# Patient Record
Sex: FEMALE | Race: AMERICAN INDIAN OR ALASKA NATIVE | NOT HISPANIC OR LATINO | URBAN - METROPOLITAN AREA
[De-identification: names, ages, dates, MRNs, and addresses within clinical notes are randomized per-mention and may not be internally consistent; named-entity substitution may affect disease eponyms.]

---

## 2023-06-23 LAB
ALANINE AMINOTRANSFERASE (SGPT) (U/L) IN SER/PLAS: 9 U/L (ref 7–45)
ALBUMIN (G/DL) IN SER/PLAS: 4.3 G/DL (ref 3.4–5)
ALBUMIN (G/DL) IN SER/PLAS: 4.3 G/DL (ref 3.4–5)
ALBUMIN (MG/L) IN URINE: 30.8 MG/L
ALBUMIN/CREATININE (UG/MG) IN URINE: 13.9 UG/MG CRT (ref 0–30)
ALKALINE PHOSPHATASE (U/L) IN SER/PLAS: 69 U/L (ref 33–110)
ANION GAP IN SER/PLAS: 14 MMOL/L (ref 10–20)
ASPARTATE AMINOTRANSFERASE (SGOT) (U/L) IN SER/PLAS: 12 U/L (ref 9–39)
BILIRUBIN DIRECT (MG/DL) IN SER/PLAS: 0.1 MG/DL (ref 0–0.3)
BILIRUBIN TOTAL (MG/DL) IN SER/PLAS: 0.3 MG/DL (ref 0–1.2)
CALCIDIOL (25 OH VITAMIN D3) (NG/ML) IN SER/PLAS: 15 NG/ML
CALCIUM (MG/DL) IN SER/PLAS: 9.6 MG/DL (ref 8.6–10.6)
CARBON DIOXIDE, TOTAL (MMOL/L) IN SER/PLAS: 26 MMOL/L (ref 21–32)
CHLORIDE (MMOL/L) IN SER/PLAS: 101 MMOL/L (ref 98–107)
CREATININE (MG/DL) IN SER/PLAS: 0.78 MG/DL (ref 0.5–1.05)
CREATININE (MG/DL) IN URINE: 222 MG/DL (ref 20–320)
ESTIMATED AVERAGE GLUCOSE FOR HBA1C: 160 MG/DL
GFR FEMALE: >90 ML/MIN/1.73M2
GLUCOSE (MG/DL) IN SER/PLAS: 168 MG/DL (ref 74–99)
HEMOGLOBIN A1C/HEMOGLOBIN TOTAL IN BLOOD: 7.2 %
PHOSPHATE (MG/DL) IN SER/PLAS: 3.5 MG/DL (ref 2.5–4.9)
POTASSIUM (MMOL/L) IN SER/PLAS: 4.5 MMOL/L (ref 3.5–5.3)
PROTEIN TOTAL: 7.4 G/DL (ref 6.4–8.2)
SODIUM (MMOL/L) IN SER/PLAS: 136 MMOL/L (ref 136–145)
THYROTROPIN (MIU/L) IN SER/PLAS BY DETECTION LIMIT <= 0.05 MIU/L: 1.51 MIU/L (ref 0.44–3.98)
TISSUE TRANSGLUTAMINASE, IGA: 2 U/ML (ref 0–14)
UREA NITROGEN (MG/DL) IN SER/PLAS: 14 MG/DL (ref 6–23)

## 2024-01-04 ENCOUNTER — APPOINTMENT (OUTPATIENT)
Dept: PRIMARY CARE | Facility: CLINIC | Age: 24
End: 2024-01-04
Payer: COMMERCIAL

## 2024-01-04 NOTE — PROGRESS NOTES
Subjective   Patient ID: Matilde Parnell is a 23 y.o. female who presents for No chief complaint on file..    Last Annual Physical:  Last Dental Visit:   Last Eye exam:  Hearing Concerns:  Diet:   Exercise Routine:       HPI     Review of Systems  Constitutional: No fever or chills, No Night Sweats  Eyes: No Blurry Vision or Eye sight problems  ENT: No Nasal Discharge, Hoarseness, sore throat  Cardiovascular: no chest pain, no palpitations and no syncope.   Respiratory: no cough, no shortness of breath during exertion and no shortness of breath at rest.   Gastrointestinal: no abdominal pain, no nausea and no vomiting.   : No vaginal discharge, burning with urination, no blood in urine or stools  Skin: No Skin rashes or Lesions  Neuro: No Headache, no dizziness or Numbness or tingling  Psych: No Anxiety, depression or sleeping problems  Heme: No Easy bleeding or brusing.     Objective   There were no vitals taken for this visit.    Physical Exam  Patient declined Chaperone  Constitutional: Alert and in no acute distress. Well developed, well nourished.   Head and Face: Head and face: Normal.    Eyes: Normal external exam. Pupils were equal in size, round, reactive to light (PERRL) with normal accommodation and extraocular movements intact (EOMI).   Ears, Nose, Mouth, and Throat: External inspection of ears and nose: Normal.  Hearing: Normal.  Nasal mucosa, septum, and turbinates: Normal.  Lips, teeth, and gums: Normal.  Oropharynx: Normal.   Neck: No neck mass was observed. Supple. Thyroid not enlarged and there were no palpable thyroid nodules.   Cardiovascular: Heart rate and rhythm were normal, normal S1 and S2. Pedal pulses: Normal. No peripheral edema.   Pulmonary: No respiratory distress. Clear bilateral breath sounds.   Breast: Normal Appearance, No Masses or lumps palpated  Abdomen: Soft nontender; no abdominal mass palpated. Normal bowel sounds. No organomegaly.   : Deferred   Musculoskeletal: No joint  swelling seen, normal movements of all extremities. Range of motion: Normal.  Muscle strength/tone: Normal.    Skin: Normal skin color and pigmentation, normal skin turgor, and no rash.   Neurologic: Deep tendon reflexes were 2+ and symmetric.   Psychiatric: Judgment and insight: Intact. Mood and affect: Normal.  Lymphatic: No cervical lymphadenopathy. Palpation of lymph nodes in axillae: Normal.  Palpation of lymph nodes in groin: Normal.    Lab Results   Component Value Date    WBC 10.7 07/29/2021    HGB 14.0 07/29/2021    HCT 44.6 07/29/2021     07/29/2021    CHOL 186 03/17/2022    TRIG 62 03/17/2022    HDL 89.8 03/17/2022    ALT 9 06/23/2023    AST 12 06/23/2023     06/23/2023    K 4.5 06/23/2023     06/23/2023    CREATININE 0.78 06/23/2023    BUN 14 06/23/2023    CO2 26 06/23/2023    TSH 1.51 06/23/2023    HGBA1C 7.2 (A) 06/23/2023           Assessment/Plan   There are no diagnoses linked to this encounter.      Dear Matilde Parnell     It was my pleasure to take care of you today in the office. Below are the things we discussed today:    1. 1. Immunizations: Yearly Flu shot is recommended.          a: COVID: Up-to-Date         b: Tetanus: Up-to-date    2. Blood Work:  3. Seen your dentist twice a year  4. Yearly Eye exam is recommended    5. BMI:   6: Diet recommendations:   Eat Clean, Try to have as many home cooked meals as possible  Avoid processed foods which contain excess calories, sugar, and sodium.    7. Exercise recommendations:   150 minutes a week to maintain your weight     If you have to loose weight, you need a better diet and exercise plan.     8. PAP -     9. Please get your Living will / Advance directive completed if you do not have one already. Please make sure our office has a copy of the latest one.       Follow up in one year for a Physical. Please call the office before your Physical to see if you need blood work completed prior to your physical.     Please call me if  any questions arise from now until your next visit. I will call you after I am done seeing patients. A Doctor is always available by phone when the office is closed. Please feel free to call for help with any problem that you feel shouldn't wait until the office re-opens.     Althea Mccall MD

## 2024-02-13 DIAGNOSIS — E10.9 TYPE 1 DIABETES MELLITUS WITH HEMOGLOBIN A1C GOAL OF LESS THAN 7.0% (MULTI): ICD-10-CM

## 2024-02-13 DIAGNOSIS — E10.9 TYPE 1 DIABETES, HBA1C GOAL < 7% (MULTI): ICD-10-CM

## 2024-02-13 RX ORDER — LANCETS 33 GAUGE
EACH MISCELLANEOUS
Qty: 200 EACH | Refills: 6 | Status: SHIPPED | OUTPATIENT
Start: 2024-02-13

## 2024-02-13 RX ORDER — GLUCAGON 3 MG/1
POWDER NASAL
COMMUNITY

## 2024-02-13 RX ORDER — BLOOD-GLUCOSE TRANSMITTER
EACH MISCELLANEOUS
Qty: 1 EACH | Refills: 3 | Status: SHIPPED | OUTPATIENT
Start: 2024-02-13

## 2024-02-13 RX ORDER — INSULIN LISPRO 100 [IU]/ML
INJECTION, SOLUTION INTRAVENOUS; SUBCUTANEOUS
COMMUNITY
End: 2024-04-04

## 2024-02-13 RX ORDER — DEXTROSE 15 G/33 G
GEL IN PACKET (GRAM) ORAL
COMMUNITY
Start: 2015-10-01

## 2024-02-13 RX ORDER — BLOOD-GLUCOSE METER
KIT MISCELLANEOUS
Qty: 200 STRIP | Refills: 11 | Status: SHIPPED | OUTPATIENT
Start: 2024-02-13 | End: 2024-02-13 | Stop reason: CLARIF

## 2024-02-13 RX ORDER — LEVOTHYROXINE SODIUM 75 UG/1
TABLET ORAL
COMMUNITY

## 2024-02-13 RX ORDER — BLOOD-GLUCOSE METER
EACH MISCELLANEOUS
Qty: 200 STRIP | Refills: 6 | Status: SHIPPED | OUTPATIENT
Start: 2024-02-13

## 2024-02-13 RX ORDER — LANCETS 30 GAUGE
EACH MISCELLANEOUS
Qty: 1 EACH | Refills: 0 | Status: SHIPPED | OUTPATIENT
Start: 2024-02-13

## 2024-02-13 RX ORDER — BLOOD-GLUCOSE METER
KIT MISCELLANEOUS
COMMUNITY
Start: 2013-12-28 | End: 2024-02-13 | Stop reason: SDUPTHER

## 2024-02-13 RX ORDER — URINE ACETONE TEST STRIPS
STRIP MISCELLANEOUS
COMMUNITY
Start: 2014-05-07

## 2024-02-13 RX ORDER — IBUPROFEN 200 MG
TABLET ORAL
COMMUNITY
Start: 2015-10-01

## 2024-02-13 RX ORDER — INSULIN GLARGINE 100 [IU]/ML
INJECTION, SOLUTION SUBCUTANEOUS
COMMUNITY

## 2024-02-14 ENCOUNTER — TELEPHONE (OUTPATIENT)
Dept: PEDIATRIC ENDOCRINOLOGY | Facility: CLINIC | Age: 24
End: 2024-02-14
Payer: COMMERCIAL

## 2024-02-14 NOTE — TELEPHONE ENCOUNTER
Called Matilde to make sure she was able to fill OneTouch testing supplies at the pharmacy. No answer, LVM asking to call office back.

## 2024-02-15 ENCOUNTER — TELEPHONE (OUTPATIENT)
Dept: PEDIATRIC ENDOCRINOLOGY | Facility: HOSPITAL | Age: 24
End: 2024-02-15
Payer: COMMERCIAL

## 2024-02-15 NOTE — TELEPHONE ENCOUNTER
Was notified that Matilde had called the office asking for a work excuse because she was not feeling well and throwing up.    Attempted to call Matilde, no answer. LVM asking to call office back.

## 2024-02-16 ENCOUNTER — APPOINTMENT (OUTPATIENT)
Dept: PRIMARY CARE | Facility: CLINIC | Age: 24
End: 2024-02-16
Payer: COMMERCIAL

## 2024-04-04 DIAGNOSIS — E10.9 TYPE 1 DIABETES MELLITUS WITH HEMOGLOBIN A1C GOAL OF LESS THAN 7.0% (MULTI): ICD-10-CM

## 2024-04-04 RX ORDER — INSULIN LISPRO 100 [IU]/ML
INJECTION, SOLUTION INTRAVENOUS; SUBCUTANEOUS
Qty: 30 ML | Refills: 5 | Status: SHIPPED | OUTPATIENT
Start: 2024-04-04

## 2024-05-23 ENCOUNTER — APPOINTMENT (OUTPATIENT)
Dept: PEDIATRIC ENDOCRINOLOGY | Facility: CLINIC | Age: 24
End: 2024-05-23
Payer: COMMERCIAL

## 2024-06-20 ENCOUNTER — APPOINTMENT (OUTPATIENT)
Dept: PEDIATRIC ENDOCRINOLOGY | Facility: CLINIC | Age: 24
End: 2024-06-20
Payer: COMMERCIAL

## 2024-06-25 ENCOUNTER — APPOINTMENT (OUTPATIENT)
Dept: PEDIATRIC ENDOCRINOLOGY | Facility: CLINIC | Age: 24
End: 2024-06-25
Payer: COMMERCIAL

## 2024-06-25 ENCOUNTER — LAB (OUTPATIENT)
Dept: LAB | Facility: LAB | Age: 24
End: 2024-06-25
Payer: COMMERCIAL

## 2024-06-25 VITALS
WEIGHT: 126.4 LBS | BODY MASS INDEX: 23.86 KG/M2 | HEART RATE: 89 BPM | HEIGHT: 61 IN | SYSTOLIC BLOOD PRESSURE: 120 MMHG | DIASTOLIC BLOOD PRESSURE: 82 MMHG

## 2024-06-25 DIAGNOSIS — E10.9 TYPE 1 DIABETES, HBA1C GOAL < 7% (MULTI): Primary | ICD-10-CM

## 2024-06-25 DIAGNOSIS — E10.9 TYPE 1 DIABETES MELLITUS WITH HEMOGLOBIN A1C GOAL OF LESS THAN 7.0% (MULTI): ICD-10-CM

## 2024-06-25 DIAGNOSIS — E10.9 TYPE 1 DIABETES, HBA1C GOAL < 7% (MULTI): ICD-10-CM

## 2024-06-25 LAB
CHOLEST SERPL-MCNC: 209 MG/DL (ref 0–199)
CHOLESTEROL/HDL RATIO: 2.5
CREAT UR-MCNC: 51.3 MG/DL (ref 20–320)
EST. AVERAGE GLUCOSE BLD GHB EST-MCNC: 171 MG/DL
HBA1C MFR BLD: 7.6 %
HDLC SERPL-MCNC: 84.5 MG/DL
LDLC SERPL CALC-MCNC: 111 MG/DL
MICROALBUMIN UR-MCNC: <7 MG/L
MICROALBUMIN/CREAT UR: NORMAL MG/G{CREAT}
NON HDL CHOLESTEROL: 125 MG/DL (ref 0–149)
POC HEMOGLOBIN A1C: 7.3 % (ref 4.2–6.5)
T4 FREE SERPL-MCNC: 1.15 NG/DL (ref 0.78–1.48)
TRIGL SERPL-MCNC: 67 MG/DL (ref 0–149)
TSH SERPL-ACNC: 4.1 MIU/L (ref 0.44–3.98)
VLDL: 13 MG/DL (ref 0–40)

## 2024-06-25 PROCEDURE — 36415 COLL VENOUS BLD VENIPUNCTURE: CPT

## 2024-06-25 PROCEDURE — 83036 HEMOGLOBIN GLYCOSYLATED A1C: CPT

## 2024-06-25 PROCEDURE — 3079F DIAST BP 80-89 MM HG: CPT | Performed by: PEDIATRICS

## 2024-06-25 PROCEDURE — 83036 HEMOGLOBIN GLYCOSYLATED A1C: CPT | Performed by: PEDIATRICS

## 2024-06-25 PROCEDURE — 80061 LIPID PANEL: CPT

## 2024-06-25 PROCEDURE — 82043 UR ALBUMIN QUANTITATIVE: CPT

## 2024-06-25 PROCEDURE — 84443 ASSAY THYROID STIM HORMONE: CPT

## 2024-06-25 PROCEDURE — 84439 ASSAY OF FREE THYROXINE: CPT

## 2024-06-25 PROCEDURE — 3074F SYST BP LT 130 MM HG: CPT | Performed by: PEDIATRICS

## 2024-06-25 PROCEDURE — 82570 ASSAY OF URINE CREATININE: CPT

## 2024-06-25 PROCEDURE — 99214 OFFICE O/P EST MOD 30 MIN: CPT | Performed by: PEDIATRICS

## 2024-06-25 RX ORDER — BLOOD-GLUCOSE TRANSMITTER
EACH MISCELLANEOUS
Qty: 1 EACH | Refills: 3 | Status: SHIPPED | OUTPATIENT
Start: 2024-06-25

## 2024-06-25 RX ORDER — BLOOD-GLUCOSE SENSOR
EACH MISCELLANEOUS
Qty: 3 EACH | Refills: 11 | Status: SHIPPED | OUTPATIENT
Start: 2024-06-25

## 2024-06-25 RX ORDER — INSULIN LISPRO 100 [IU]/ML
INJECTION, SOLUTION SUBCUTANEOUS
Qty: 15 ML | Refills: 11 | Status: SHIPPED | OUTPATIENT
Start: 2024-06-25

## 2024-06-25 NOTE — PROGRESS NOTES
Subjective   Matilde Parnell is a 23 y.o. female with type 1 diabetes.     HPI  Other Medical History: celiac and hypothyroid     Manages diabetes with LANTUS AND PEN INECTIONS; 2 WEEKS SINCE USED DEXCOM  Insulin Instructions  meals   Lantus Solostar U-100 Insulin 100 unit/mL (3 mL) insulin pen   Last edited by Samantha Dumont RN on 6/25/2024 at 10:36 AM      The patient will be instructed to take 0 units of insulin at the blood glucose target, and will dose in 0.5 unit increments.      Mealtime Carb Ratio (g/unit) Sensitivity Factor (mg/dL/unit) BG Target (mg/dL)   12 5 35 110   3a 8 35 110   6a 5 35 110   11a 5 40 110   2p 5 35 110   5p 5 40 110   10 5 35 110     lantus   Lantus Solostar U-100 Insulin 100 unit/mL (3 mL) insulin pen   Last edited by Samantha Dumont RN on 6/25/2024 at 10:30 AM      Time of Day Dose (units)   noon 32      -Total daily basal: 33  -BG average: 215   -CGM wear time (%): 0     Concerns at this visit:    fasting today, would like annual labs  Would like to discuss transition to adult provider  C/o fatigue and weight gain  Social:    college grad, starts job in [place] in October  Screens:  Eye exam: due  Labs: >1 year ago  Flu shot: fall 2023     Insulin Injections/Pump sites:   - Gives mealtime insulin before eating.     Carbohydrate counting:   - Patient states they are good at counting carbs.  - Patient states they are good at adherence to bolusing for carbs.      Goals         <enter goal here> (pt-stated)       a1c to under 7% (pt-stated)       Use pump and Dexcom consistently              Using AID System: No  Hypoglycemia Unawareness : No  ED/Hospitalizations related to Diabetes: Yes  Diabetes related ED/Hospitalization Date: 02/16/24  ED/Hospitalization not related to Diabetes: No  ED/Hospitalization related to DKA: No  Severe Hypoglycemia (coma, seizure, disorientation, or the need for high dose glucagon) since last visit: No         Review of Systems    Objective   /82   " Pulse 89   Ht 1.557 m (5' 1.3\")   Wt 57.3 kg (126 lb 6.4 oz)   BMI 23.65 kg/m²      Physical Exam   General: well appearing female in no distress  HEENT: normal cephalic, atraumatic  Thyroid: non enlarged thyroid gland; able to palpate but not definitively enlarged;  no cervical lymphadenopathy  CV: RRR  Resp: non labored breathing  Abdomen: non distended  Skin: no lipohypertrophy  Neuro: grossly normal movements  Psych: mature, confident    Lab  Lab Results   Component Value Date    HGBA1C 7.3 (A) 06/25/2024    HGBA1C 8.0 (H) 02/15/2024    HGBA1C 7.2 (A) 06/23/2023    HGBA1C 7.4 (H) 10/24/2022       Assessment/Plan   Matilde Parnell is a 23 y.o. 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 She had a DKA in February associated with parainfluenza infection and pump site failure and she is taking a pump break. A1C is near target at 7.3%.   She has primary autoimmune hypothyroidism and has been feeling more tired lately.     Glucose Monitoring: Has been using her pump for dose calculations. CGM not worn in 2 weeks so was not downloaded.     Plan:    - simplified doses so ICR and ISF are the same all day as she desires staying on shots  - re-start CGM    Type 1 diabetes, HbA1c goal < 7% (Multi)  -     Referral to Ophthalmology; Future  ANNUAL LABS:   -     Hemoglobin A1C; Future  -     Albumin , Urine Random; Future  -     Lipid Panel; Future  Primary hypothyroidism       -       continue levothyroxine 75mcg daily until labs result  -     Thyroid Stimulating Hormone; Future  -     Thyroxine, Free; Future  Care coordination:  - recommend she accept the work insurance when she starts as she will not be able to enroll outside of open enrollment  - eventually she may want to find an adult endo in [place]; during transition can follow up with [facility], Dr. Gill if available.     Follow up in 3 mos with our practice     Insulin " Instructions  lantus   Lantus Solostar U-100 Insulin 100 unit/mL (3 mL) insulin pen   Last edited by Samantha Dumont RN on 6/25/2024 at 10:30 AM      Time of Day Dose (units)   noon 32     Mealtime Injections   insulin lispro 100 unit/mL injection (HumaLOG)   Last edited by Samantha Dumont RN on 6/25/2024 at 10:52 AM      The patient will be instructed to take 0 units of insulin at the blood glucose target, and will dose in 0.5 unit increments.      Mealtime Carb Ratio (g/unit) Sensitivity Factor (mg/dL/unit) BG Target (mg/dL)   all meals 5 30 110       Dolly Santos MD

## 2024-06-25 NOTE — PATIENT INSTRUCTIONS
It was good to see you today!    Get your annual labs drawn.  We will do your levothyroxine prescription once we have the labs. Continue levothyroxine 75mcg daily for now.     See the eye doctor.    WE recommend simplifying your doses if you are staying on shots    Insulin Instructions  lantus   Lantus Solostar U-100 Insulin 100 unit/mL (3 mL) insulin pen   Last edited by Samantha Dumont RN on 6/25/2024 at 10:30 AM      Time of Day Dose (units)   noon 32     Mealtime Injections   insulin lispro 100 unit/mL injection (HumaLOG)   Last edited by Samantha Dumont RN on 6/25/2024 at 10:52 AM      The patient will be instructed to take 0 units of insulin at the blood glucose target, and will dose in 0.5 unit increments.      Mealtime Carb Ratio (g/unit) Sensitivity Factor (mg/dL/unit) BG Target (mg/dL)   all meals 5 30 110     Return to clinic in 3 months to see Linda Gill MD    [phone] weekdays 830-5pm  [phone] weekends or after 5pm weekdays   [email or web address]

## 2024-06-26 DIAGNOSIS — E10.9 TYPE 1 DIABETES, HBA1C GOAL < 7% (MULTI): ICD-10-CM

## 2024-06-26 DIAGNOSIS — E03.9 PRIMARY HYPOTHYROIDISM: Primary | ICD-10-CM

## 2024-06-26 RX ORDER — LEVOTHYROXINE SODIUM 88 UG/1
88 TABLET ORAL DAILY
Qty: 30 TABLET | Refills: 11 | Status: SHIPPED | OUTPATIENT
Start: 2024-06-26 | End: 2025-06-26

## 2024-06-26 NOTE — RESULT ENCOUNTER NOTE
Hi Matilde,  Your TSH level is a bit high, indicating that you need more levothyroxine. I recommend we increase your dose to 88mcg daily and then repeat TSH and free T4 in one month. Please reply so I know you got the message.  Dolly Santos MD

## 2024-06-27 DIAGNOSIS — E55.9 VITAMIN D DEFICIENCY: Primary | ICD-10-CM

## 2024-06-27 RX ORDER — INSULIN GLARGINE 100 [IU]/ML
INJECTION, SOLUTION SUBCUTANEOUS
Qty: 15 ML | Refills: 11 | Status: SHIPPED | OUTPATIENT
Start: 2024-06-27

## 2024-07-08 DIAGNOSIS — E10.9 TYPE 1 DIABETES MELLITUS WITH HEMOGLOBIN A1C GOAL OF LESS THAN 7.0% (MULTI): ICD-10-CM

## 2024-07-08 DIAGNOSIS — E10.9 TYPE 1 DIABETES, HBA1C GOAL < 7% (MULTI): ICD-10-CM

## 2024-07-08 DIAGNOSIS — E03.9 PRIMARY HYPOTHYROIDISM: ICD-10-CM

## 2024-07-09 RX ORDER — URINE ACETONE TEST STRIPS
STRIP MISCELLANEOUS
Qty: 50 EACH | Refills: 3 | Status: SHIPPED | OUTPATIENT
Start: 2024-07-09

## 2024-07-09 RX ORDER — LANCETS 33 GAUGE
EACH MISCELLANEOUS
Qty: 500 EACH | Refills: 3 | Status: SHIPPED | OUTPATIENT
Start: 2024-07-09

## 2024-07-09 RX ORDER — BLOOD-GLUCOSE SENSOR
EACH MISCELLANEOUS
Qty: 9 EACH | Refills: 3 | Status: SHIPPED | OUTPATIENT
Start: 2024-07-09

## 2024-07-09 RX ORDER — INSULIN LISPRO 100 [IU]/ML
INJECTION, SOLUTION INTRAVENOUS; SUBCUTANEOUS
Qty: 90 ML | Refills: 3 | Status: SHIPPED | OUTPATIENT
Start: 2024-07-09

## 2024-07-09 RX ORDER — BLOOD-GLUCOSE METER
EACH MISCELLANEOUS
Qty: 500 STRIP | Refills: 3 | Status: SHIPPED | OUTPATIENT
Start: 2024-07-09

## 2024-07-09 RX ORDER — LEVOTHYROXINE SODIUM 88 UG/1
88 TABLET ORAL DAILY
Qty: 90 TABLET | Refills: 3 | Status: SHIPPED | OUTPATIENT
Start: 2024-07-09 | End: 2025-07-09

## 2024-08-05 ENCOUNTER — TELEPHONE (OUTPATIENT)
Dept: PEDIATRIC ENDOCRINOLOGY | Facility: HOSPITAL | Age: 24
End: 2024-08-05
Payer: COMMERCIAL

## 2024-08-05 NOTE — TELEPHONE ENCOUNTER
Please see the following email via [employer]:    Momo Clayton,    Thanks for reaching out to us. Are you using your pump or doing insulin injections? Would you be interested in trying a Dexcom G7? We do have a sample in the office that we could mail you. You would just need to use the Dexcom G7 maynor instead of Dexcom G6. We could also send a prescription to the pharmacy for you to purchase out of pocket using a GoodRx card. I would also recommend applying for the Dexcom Patient Assistance Program to see if they are able to help you with Dexcom supplies. Is your current address [address] [place] [address]? Let us know if you would like to try Dexcom G7 and we can mail it to you. Let us know if you need anything else, have a great day.      Caitlyn Perez RN, MSN  Pediatric Endocrinology  [facility] [place] [address]  p. [phone]  f. [phone]    From: [email or web address] <[email or web address]   Sent: Saturday, August 3, 2024 1:05 PM  To: [employer] <[email or web address]>  Subject: Request for Continuous Glucose Monitor Sample or Prescription    Hello,  I am currently between insurance plans and won't have coverage until October. I was wondering if you offer any sample continuous glucose monitors. If not, could you please provide a prescription for a Dexcom G6 sensor to be filled at [facility]? I believe [employer] might have a coupon available for me.  Thank you,  Matilde Parnell

## 2024-09-19 ENCOUNTER — APPOINTMENT (OUTPATIENT)
Dept: PEDIATRIC ENDOCRINOLOGY | Facility: CLINIC | Age: 24
End: 2024-09-19
Payer: COMMERCIAL

## 2024-10-25 DIAGNOSIS — E10.9 TYPE 1 DIABETES, HBA1C GOAL < 7% (MULTI): ICD-10-CM

## 2024-10-25 RX ORDER — BLOOD-GLUCOSE SENSOR
EACH MISCELLANEOUS
Qty: 3 EACH | Refills: 1 | Status: SHIPPED | OUTPATIENT
Start: 2024-10-25

## 2024-10-29 ENCOUNTER — TELEPHONE (OUTPATIENT)
Dept: PEDIATRIC ENDOCRINOLOGY | Facility: HOSPITAL | Age: 24
End: 2024-10-29
Payer: COMMERCIAL

## 2025-04-17 DIAGNOSIS — E10.9 TYPE 1 DIABETES, HBA1C GOAL < 7% (MULTI): Primary | ICD-10-CM

## 2025-04-17 DIAGNOSIS — E03.9 PRIMARY HYPOTHYROIDISM: ICD-10-CM

## 2025-05-22 ENCOUNTER — APPOINTMENT (OUTPATIENT)
Dept: PEDIATRIC ENDOCRINOLOGY | Facility: CLINIC | Age: 25
End: 2025-05-22
Payer: COMMERCIAL

## 2025-05-22 VITALS
HEART RATE: 78 BPM | BODY MASS INDEX: 25.47 KG/M2 | DIASTOLIC BLOOD PRESSURE: 64 MMHG | WEIGHT: 134.92 LBS | SYSTOLIC BLOOD PRESSURE: 116 MMHG | HEIGHT: 61 IN

## 2025-05-22 DIAGNOSIS — E06.3 HYPOTHYROIDISM DUE TO HASHIMOTO THYROIDITIS: ICD-10-CM

## 2025-05-22 DIAGNOSIS — E55.9 VITAMIN D DEFICIENCY: ICD-10-CM

## 2025-05-22 DIAGNOSIS — E10.65 TYPE 1 DIABETES MELLITUS WITH HYPERGLYCEMIA (MULTI): Primary | ICD-10-CM

## 2025-05-22 LAB — POC HEMOGLOBIN A1C: 8.1 % (ref 4.2–6.5)

## 2025-05-22 PROCEDURE — 3074F SYST BP LT 130 MM HG: CPT | Performed by: INTERNAL MEDICINE

## 2025-05-22 PROCEDURE — 83036 HEMOGLOBIN GLYCOSYLATED A1C: CPT | Performed by: PEDIATRICS

## 2025-05-22 PROCEDURE — 3008F BODY MASS INDEX DOCD: CPT | Performed by: INTERNAL MEDICINE

## 2025-05-22 PROCEDURE — 3052F HG A1C>EQUAL 8.0%<EQUAL 9.0%: CPT | Performed by: INTERNAL MEDICINE

## 2025-05-22 PROCEDURE — 99214 OFFICE O/P EST MOD 30 MIN: CPT | Performed by: INTERNAL MEDICINE

## 2025-05-22 PROCEDURE — 3078F DIAST BP <80 MM HG: CPT | Performed by: INTERNAL MEDICINE

## 2025-05-22 RX ORDER — ISOPROPYL ALCOHOL 70 ML/100ML
SWAB TOPICAL
Qty: 200 EACH | Refills: 11 | Status: SHIPPED | OUTPATIENT
Start: 2025-05-22

## 2025-05-22 RX ORDER — BLOOD SUGAR DIAGNOSTIC
STRIP MISCELLANEOUS
Qty: 200 EACH | Refills: 1 | Status: SHIPPED | OUTPATIENT
Start: 2025-05-22

## 2025-05-22 RX ORDER — INSULIN LISPRO 100 [IU]/ML
INJECTION, SOLUTION INTRAVENOUS; SUBCUTANEOUS
Qty: 90 ML | Refills: 3 | Status: SHIPPED | OUTPATIENT
Start: 2025-05-22

## 2025-05-22 RX ORDER — INSULIN GLARGINE 100 [IU]/ML
INJECTION, SOLUTION SUBCUTANEOUS
Qty: 15 ML | Refills: 1 | Status: SHIPPED | OUTPATIENT
Start: 2025-05-22

## 2025-05-22 RX ORDER — BLOOD-GLUCOSE METER
EACH MISCELLANEOUS
Qty: 500 STRIP | Refills: 3 | Status: SHIPPED | OUTPATIENT
Start: 2025-05-22

## 2025-05-22 RX ORDER — INSULIN LISPRO 100 [IU]/ML
INJECTION, SOLUTION SUBCUTANEOUS
Qty: 15 ML | Refills: 1 | Status: SHIPPED | OUTPATIENT
Start: 2025-05-22

## 2025-05-22 ASSESSMENT — ENCOUNTER SYMPTOMS
DEPRESSION: 0
LOSS OF SENSATION IN FEET: 0
OCCASIONAL FEELINGS OF UNSTEADINESS: 0

## 2025-05-22 NOTE — ADDENDUM NOTE
Addended by: YASSINE BLANCHARD on: 5/22/2025 11:02 AM     Modules accepted: Orders, Level of Service

## 2025-05-22 NOTE — PATIENT INSTRUCTIONS
Good to see you! A1c is 8.1% today.     Plan:  More basal all day  Lower targets at bedtime  Restart CGM  Labs due today  Continue levothyroxine 88 mcg. Will determine if dose needs to change based on labs  If you want to go back to MDI, consider in-pen  Schedule eye exam in [place]  Financial support: [facility]  Adult Endo Provider in [place]: recommend someone at [facility]

## 2025-05-22 NOTE — PROGRESS NOTES
[facility]  Pediatric Diabetes Center    Subjective   Matilde Parnell is a 24 y.o. female with type 1 diabetes.   Last seen 6/2024 with Dr. Santos    Other Medical History: Celiac and hypothyroid    Hypothyroidism:  - 88 mcg daily, before breakfast. If missed will double dose the next day. Missed this AM   - noticing hair thinning and loss     Celiac:   - eats gluten free  - TTG had been WNL    Other Medications:  Cyterizine- PRN  Vitamin D: 50,000 units once weekly     Concerns at this visit:   - fatigue  - was on pens for 6-8 months. Feels like control was better on pens, but likes tandem when has CGM. Currently without CGM  - needs synthroid refill. Plans to get labs today  - Needs an adult provider in [place]     Manages diabetes with Tandem and glucometer  Tandem (manual mode)   Basal Rate   Total Basal Dose: 27.1 units/day   Time units/hr   12:00 AM 1.1   11:00 AM 1.15    2:00 PM 1.05    5:00 PM 1.2      Blood Glucose Target   Time mg/dL   12:00  - 140    3:00  - 150    6:00  - 100   10:00  - 120      Sensitivity Factor   Time mg/dL/unit   12:00 AM 35    6:00 AM 40    2:00 PM 35    5:00 PM 40   10:00 PM 35      Carb Ratio   Time g/unit   12:00 AM 5     Insulin Injections/Pump sites:   - Gives mealtime insulin before, during, and after eating.  - Site rotation: arms, stomach, and thighs  - itchiness with tape on the skin-      Carbohydrate counting:   - Patient states they are good at counting carbs.  - Patient states they are good at adherence to bolusing for carbs.     Hypoglycemia:  - symptoms: hot, shaky, weak   - uses apple sauce or meek suns to treat lows  - treats with 15 gms carbs  - Nocturnal hypoglycemia: yes, rarely  Other: Checks ketones with: high blood sugars sometimes, always with sickness     Social:   - lives alone in [place]. Plans to live there for a few years  - working in accounting     Education Reviewed: pump, cgm ketone monitoring,  "target glucose, A1c    Diabetes Hx  Date of Diabetes Diagnosis: 12/01/09  Antibody status at diagnosis: + BRENDA and [place]    Insulin Delivery  Pump Type: Tandem  Using AID System: No  Boluses Per Day (user initiated): 4  Total Daily Dose of Insulin (units): 55.99  Total Basal Insulin Per Day (units): 26.76  % Basal: 47.79  % Bolus: 52.21  Total Daily Carbs (grams): 120    Glucose Monitoring  How do you primarily monitor blood sugars?: Meter    Clinical Details  Hypoglycemia Unawareness : No  Severe Hypoglycemia (coma, seizure, disorientation, or the need for high dose glucagon) since last visit: No    Hospitalizations (since last endocrine appointment)  ED/Hospitalizations related to Diabetes: No  ED/Hospitalization not related to Diabetes: No  ED/Hospitalization related to DKA: No    Education  Targeted Diabetes Re-Education: 05/22/25    Screens  Labs: 06/25/24  Eye Exam: Yes  Eye Exam Date: 03/01/24      Review of Systems   Eyes:         Wears glasses   Genitourinary:         LMP: 4/20   All other systems reviewed and are negative.      Objective   /64 (BP Location: Right arm, Patient Position: Sitting, BP Cuff Size: Adult)   Pulse 78   Ht 1.552 m (5' 1.1\")   Wt 61.2 kg (134 lb 14.7 oz)   BMI 25.41 kg/m²      Physical Exam  Vitals reviewed.   Constitutional:       Appearance: Normal appearance.   HENT:      Nose: No rhinorrhea.      Mouth/Throat:      Mouth: Mucous membranes are moist.   Eyes:      Conjunctiva/sclera: Conjunctivae normal.   Pulmonary:      Effort: Pulmonary effort is normal.   Skin:     General: Skin is warm and dry.      Findings: No lesion or rash.   Neurological:      General: No focal deficit present.      Mental Status: She is alert.   Psychiatric:         Mood and Affect: Mood normal.        Lab Results   Component Value Date    HGBA1C 8.1 (A) 05/22/2025    HGBA1C 7.6 (H) 06/25/2024    HGBA1C 7.3 (A) 06/25/2024    HGBA1C 8.0 (H) 02/15/2024     Lab Results   Component Value Date    " TSH 4.10 (H) 06/25/2024    FREET4 1.15 06/25/2024       Assessment/Plan   Matilde Parnell is a 24 y.o. female with     Type 1 diabetes mellitus with hyperglycemia   A1c has uptrended further above goal  Using Tslim but not in Control IQ (bc does not have sensors)    Hypothyroidism due to Hashimoto thyroiditis  Last TSH elevated a year ago; overall clinically euthyroid    Plan:    Pump setting changes: More basal all day, lower targets at bedtime (full settings below)  Restart CGM  Labs due today:  -     Albumin-Creatinine Ratio, Urine Random  -     Hemoglobin A1C  -     Lipid Panel  -     Comprehensive Metabolic Panel  -     TSH with reflex to Free T4 if abnormal  -     Vitamin D 25-Hydroxy,Total (for eval of Vitamin D levels)  Continue levothyroxine 88 mcg. Will refill after TFTs reviewed  Consider in-pen if switching back to MDI  Due for eye exam, will schedule in [place]  Handout provided on [facility], referral placed  Follow up with endocrinology in [place]      Insulin Instructions  Tandem Tslim   insulin lispro 100 unit/mL pen (HumaLOG Moiz Kwikpen)   Last edited by Linda Gill MD on 5/22/2025 at 10:45 AM      Basal Rate   Total Basal Dose: 29.8 units/day   Time units/hr   12:00 AM 1.25   12:00 PM 1.15    2:00 PM 1.25      Blood Glucose Target   Time mg/dL   12:00  - 120    3:00  - 120    6:00  - 100   10:00  - 120      Sensitivity Factor   Time mg/dL/unit   12:00 AM 35    6:00 AM 40    2:00 PM 35   10:00 PM 35      Carb Ratio   Time g/unit   12:00 AM 5       MD Sharon Easton, RN

## 2025-05-23 DIAGNOSIS — E03.9 PRIMARY HYPOTHYROIDISM: ICD-10-CM

## 2025-05-23 DIAGNOSIS — E55.9 VITAMIN D DEFICIENCY: Primary | ICD-10-CM

## 2025-05-23 LAB
25(OH)D3+25(OH)D2 SERPL-MCNC: 16 NG/ML (ref 30–100)
ALBUMIN SERPL-MCNC: 4.5 G/DL (ref 3.6–5.1)
ALBUMIN/CREAT UR: 2 MG/G CREAT
ALP SERPL-CCNC: 71 U/L (ref 31–125)
ALT SERPL-CCNC: 11 U/L (ref 6–29)
ANION GAP SERPL CALCULATED.4IONS-SCNC: 10 MMOL/L (CALC) (ref 7–17)
AST SERPL-CCNC: 13 U/L (ref 10–30)
BILIRUB SERPL-MCNC: 0.4 MG/DL (ref 0.2–1.2)
BUN SERPL-MCNC: 11 MG/DL (ref 7–25)
CALCIUM SERPL-MCNC: 9.3 MG/DL (ref 8.6–10.2)
CHLORIDE SERPL-SCNC: 100 MMOL/L (ref 98–110)
CHOLEST SERPL-MCNC: 179 MG/DL
CHOLEST/HDLC SERPL: 2.5 (CALC)
CO2 SERPL-SCNC: 24 MMOL/L (ref 20–32)
CREAT SERPL-MCNC: 0.77 MG/DL (ref 0.5–0.96)
CREAT UR-MCNC: 107 MG/DL (ref 20–275)
EGFRCR SERPLBLD CKD-EPI 2021: 111 ML/MIN/1.73M2
EST. AVERAGE GLUCOSE BLD GHB EST-MCNC: 200 MG/DL
EST. AVERAGE GLUCOSE BLD GHB EST-SCNC: 11.1 MMOL/L
GLUCOSE SERPL-MCNC: 280 MG/DL (ref 65–99)
HBA1C MFR BLD: 8.6 %
HDLC SERPL-MCNC: 73 MG/DL
LDLC SERPL CALC-MCNC: 91 MG/DL (CALC)
MICROALBUMIN UR-MCNC: 0.2 MG/DL
NONHDLC SERPL-MCNC: 106 MG/DL (CALC)
POTASSIUM SERPL-SCNC: 4.5 MMOL/L (ref 3.5–5.3)
PROT SERPL-MCNC: 7.8 G/DL (ref 6.1–8.1)
SODIUM SERPL-SCNC: 134 MMOL/L (ref 135–146)
TRIGL SERPL-MCNC: 65 MG/DL
TSH SERPL-ACNC: 3.72 MIU/L

## 2025-05-23 RX ORDER — LEVOTHYROXINE SODIUM 88 UG/1
88 TABLET ORAL DAILY
Qty: 90 TABLET | Refills: 3 | Status: SHIPPED | OUTPATIENT
Start: 2025-05-23 | End: 2026-05-23

## 2025-05-23 RX ORDER — ERGOCALCIFEROL 1.25 MG/1
1.25 CAPSULE ORAL WEEKLY
Qty: 12 CAPSULE | Refills: 0 | Status: SHIPPED | OUTPATIENT
Start: 2025-05-23 | End: 2025-08-21

## 2025-08-07 DIAGNOSIS — E10.9 TYPE 1 DIABETES, HBA1C GOAL < 7% (MULTI): ICD-10-CM

## 2025-08-07 RX ORDER — BLOOD-GLUCOSE SENSOR
EACH MISCELLANEOUS
Qty: 2 EACH | Refills: 11 | Status: SHIPPED | OUTPATIENT
Start: 2025-08-07